# Patient Record
Sex: FEMALE | Race: WHITE | NOT HISPANIC OR LATINO | URBAN - NONMETROPOLITAN AREA
[De-identification: names, ages, dates, MRNs, and addresses within clinical notes are randomized per-mention and may not be internally consistent; named-entity substitution may affect disease eponyms.]

---
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
## 2020-11-25 ENCOUNTER — IMPORTED ENCOUNTER (OUTPATIENT)
Dept: URBAN - NONMETROPOLITAN AREA CLINIC 1 | Facility: CLINIC | Age: 20
End: 2020-11-25

## 2020-11-25 PROBLEM — H52.13: Noted: 2020-11-25

## 2020-11-25 PROCEDURE — 92014 COMPRE OPH EXAM EST PT 1/>: CPT

## 2020-11-25 PROCEDURE — 92310 CONTACT LENS FITTING OU: CPT

## 2020-11-25 PROCEDURE — 92015 DETERMINE REFRACTIVE STATE: CPT

## 2020-11-25 NOTE — PATIENT DISCUSSION
Myopia / Astigmatism  - Discussed diagnosis in detail with patient- New Glasses and CL RX given today- Continue to monitor- RTC 1 year complete; 's Notes: MR 8/15/18

## 2021-02-18 ENCOUNTER — IMPORTED ENCOUNTER (OUTPATIENT)
Dept: URBAN - NONMETROPOLITAN AREA CLINIC 1 | Facility: CLINIC | Age: 21
End: 2021-02-18

## 2021-02-18 PROCEDURE — 92310 CONTACT LENS FITTING OU: CPT

## 2021-02-18 PROCEDURE — V2520 CONTACT LENS HYDROPHILIC: HCPCS

## 2021-02-18 PROCEDURE — 92014 COMPRE OPH EXAM EST PT 1/>: CPT

## 2021-02-18 PROCEDURE — 92015 DETERMINE REFRACTIVE STATE: CPT

## 2022-02-23 ENCOUNTER — COMPREHENSIVE EXAM (OUTPATIENT)
Dept: URBAN - NONMETROPOLITAN AREA CLINIC 1 | Facility: CLINIC | Age: 22
End: 2022-02-23

## 2022-02-23 DIAGNOSIS — H52.13: ICD-10-CM

## 2022-02-23 PROCEDURE — 92310 CONTACT LENS FITTING OU: CPT

## 2022-02-23 PROCEDURE — 92015 DETERMINE REFRACTIVE STATE: CPT

## 2022-02-23 PROCEDURE — 92014 COMPRE OPH EXAM EST PT 1/>: CPT

## 2022-02-23 RX ORDER — FLUOROMETHOLONE 1 MG/ML: 1 SUSPENSION/ DROPS OPHTHALMIC

## 2022-02-23 ASSESSMENT — VISUAL ACUITY
OS_CC: 20/25
OD_CC: 20/20

## 2022-02-23 ASSESSMENT — KERATOMETRY
OS_K1POWER_DIOPTERS: 42.00
OD_K2POWER_DIOPTERS: 42.00
OD_K1POWER_DIOPTERS: 42.25
OS_AXISANGLE2_DEGREES: 3
OS_K2POWER_DIOPTERS: 43.75
OD_AXISANGLE2_DEGREES: 180
OS_AXISANGLE_DEGREES: 093
OD_AXISANGLE_DEGREES: 090

## 2022-02-23 ASSESSMENT — TONOMETRY
OS_IOP_MMHG: 15
OD_IOP_MMHG: 15

## 2022-02-23 NOTE — PATIENT DISCUSSION
Discussed diagnosis in detail with patient. New Glasses RX given today. Will not give CL RX at this time due to the amount of GPC. Will consider different brand of CL at follow up appointment. Continue to monitor.

## 2022-02-23 NOTE — PATIENT DISCUSSION
Discussed diagnosis in detail with patient. D/C  CLS at this time. Start FML TID OU x 1 week then taper to BID OU x 2 weeks then QD x 1 week.

## 2022-04-10 ASSESSMENT — VISUAL ACUITY
OS_SC: J1+
OD_SC: 20/25+2
OS_PH: 20/25-
OD_SC: J1+
OS_SC: 20/60
OS_SC: 20/20-2
OD_SC: J1+
OS_SC: 20/25
OD_SC: 20/20-2
OS_CC: J1+
OD_SC: 20/20
OD_SC: J1+
OS_SC: J1+
OD_SC: 20/25
OS_SC: 20/20-
OD_CC: J1+
OD_SC: J1+
OS_SC: 20/60
OD_SC: 20/20-
OS_SC: J1+
OS_SC: 20/20
OS_SC: 20/25
OD_SC: 20/20
OD_SC: 20/20-
OS_SC: J1+

## 2022-04-10 ASSESSMENT — TONOMETRY
OD_IOP_MMHG: 16
OS_IOP_MMHG: 16
OD_IOP_MMHG: 16
OS_IOP_MMHG: 17
OD_IOP_MMHG: 18
OS_IOP_MMHG: 16
OD_IOP_MMHG: 18
OD_IOP_MMHG: 16
OS_IOP_MMHG: 15
OS_IOP_MMHG: 16

## 2022-04-10 ASSESSMENT — KERATOMETRY
OD_K1POWER_DIOPTERS: 42.25
OD_K2POWER_DIOPTERS: 43.75
OS_K1POWER_DIOPTERS: 42.00
OS_AXISANGLE_DEGREES: 093
OS_K2POWER_DIOPTERS: 43.75
OD_AXISANGLE_DEGREES: 090